# Patient Record
Sex: FEMALE | Race: WHITE | URBAN - METROPOLITAN AREA
[De-identification: names, ages, dates, MRNs, and addresses within clinical notes are randomized per-mention and may not be internally consistent; named-entity substitution may affect disease eponyms.]

---

## 2021-12-30 ENCOUNTER — WEB ENCOUNTER (OUTPATIENT)
Dept: URBAN - METROPOLITAN AREA CLINIC 105 | Facility: CLINIC | Age: 26
End: 2021-12-30

## 2021-12-30 ENCOUNTER — LAB OUTSIDE AN ENCOUNTER (OUTPATIENT)
Dept: URBAN - METROPOLITAN AREA CLINIC 105 | Facility: CLINIC | Age: 26
End: 2021-12-30

## 2021-12-30 ENCOUNTER — OFFICE VISIT (OUTPATIENT)
Dept: URBAN - METROPOLITAN AREA CLINIC 105 | Facility: CLINIC | Age: 26
End: 2021-12-30
Payer: COMMERCIAL

## 2021-12-30 VITALS
WEIGHT: 118.4 LBS | SYSTOLIC BLOOD PRESSURE: 117 MMHG | HEART RATE: 54 BPM | BODY MASS INDEX: 27.4 KG/M2 | DIASTOLIC BLOOD PRESSURE: 77 MMHG | HEIGHT: 55 IN | TEMPERATURE: 96.8 F

## 2021-12-30 DIAGNOSIS — K59.00 CONSTIPATION, UNSPECIFIED: ICD-10-CM

## 2021-12-30 DIAGNOSIS — L98.8 FISTULA: ICD-10-CM

## 2021-12-30 DIAGNOSIS — M54.50 LUMBAR PAIN: ICD-10-CM

## 2021-12-30 DIAGNOSIS — R11.2 NAUSEA WITH VOMITING, UNSPECIFIED: ICD-10-CM

## 2021-12-30 DIAGNOSIS — K90.89 BILE SALT-INDUCED DIARRHEA: ICD-10-CM

## 2021-12-30 DIAGNOSIS — Z90.49 HISTORY OF BOWEL RESECTION: ICD-10-CM

## 2021-12-30 DIAGNOSIS — R10.9 ABDOMINAL PAIN OF MULTIPLE SITES: ICD-10-CM

## 2021-12-30 PROBLEM — 10743008: Status: ACTIVE | Noted: 2021-12-30

## 2021-12-30 PROCEDURE — 99205 OFFICE O/P NEW HI 60 MIN: CPT | Performed by: INTERNAL MEDICINE

## 2021-12-30 RX ORDER — NORELGESTROMIN AND ETHINYL ESTRADIOL 150; 35 UG/D; UG/D
AS DIRECTED PATCH TRANSDERMAL
Status: ACTIVE | COMMUNITY

## 2021-12-30 RX ORDER — CHOLESTYRAMINE 4 G/9G
1 PACKET MIXED WITH WATER OR NON-CARBONATED DRINK POWDER, FOR SUSPENSION ORAL
Qty: 30 | Refills: 2 | OUTPATIENT
Start: 2021-12-30

## 2021-12-30 RX ORDER — ONDANSETRON 4 MG/1
1 TABLET ON THE TONGUE AND ALLOW TO DISSOLVE TABLET, ORALLY DISINTEGRATING ORAL
Qty: 15 | Refills: 2 | OUTPATIENT
Start: 2021-12-30

## 2021-12-30 NOTE — HPI-TODAY'S VISIT:
Today, the patient presents with a complicated prior medical/surgical history. She was born at 29 weeks - 10 days after the birth she became. 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 She was at [facility] for a total of 5 months. Other surgeries include a partial bowel resection with an appendectomy. At 10, she started having frequent abdominal pain - on laparoscopy adhesions were found.  In Feb 2019, she was admitted to [facility] due to nausea/vomiting - X-ray found she had a SBO. 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 She was also found to have a 4 inch gallstone and diverticulitis during this time. The patient still has her gallbladder, but the patient would like it taken out in order to avoid the possibility of having another gallstone. The patient's mother states that the patient's gallbladder was not taken out during that hospital visit due to her already being in surgery for so long. She has previously had CT, US, and MRI. She has not had had a colonoscpy - patient says the initial surgeon, Dr. Castaneda, was apprehensive doing a colonoscopy stating she had a higher risk of perforation.   Currently has constant nausea since the age of 10. She does not take an anti-nausea med at home - only drinks water and lays down with a hot pad when nauseated She says she received an IV anti-nausea med (either Zofran or Reglan) in the hospital, but it worsened her nausea and resulted in emesis. She has experienced weight loss at times due to nausea - at its worst, her weight was 106 lbs last year (normal is 120s) last year, but also notes having stress (bad roommate situation and school stress) and a loss of appetite. She last had emesis when she was last at school, which was 6 weeks ago. She has previously followed with a GI - Dr. Juan White - and discussed the issue of nausea with him. She says her PCP thinks she has POTS - patient is trying to establish with a cardiologist at [facility] - due to her nausea being coupled with an elevated heart rate and notes improvement when lying down. Nausea is the most limiting symptom to her she states.   She goes to [facility] and follows with a PCP at the [facility]. She says tests done with her PCP noted that her blood cell count was abnormal and "had inflammation." She was having a lot of watery stools at the time. She has diarrhea 2x/week with 1 formed BM in between. Her diarrhea is typically urgent following a high-fat meal. She also notes a burning at the anus when having a BM and the feeling of "little sharp pieces" coming out of her anus with a BM. Diarrhea is the most bothersome part of her bowel habit. There are also times where she is constipated - can go up to 3 days without a BM and strains. Stools can be little balls. The patient has been diagnosed with IBS and was prescribed a med by Dr. White. She notes a LLQ "tightening" that occurs 1x/week and lasts 2 seconds. She notes lumbar pain - says she was told in the hospital that it could be related to her gallbladder. She notes "food aversions" - avoids red meats and seeded foods (incorrectly advised previously to avoid due to prior history of diverticulitis). Her diet consists of mashed potatoes, soups, kale, chicken, yogurt, and cheese.

## 2021-12-31 ENCOUNTER — DASHBOARD ENCOUNTERS (OUTPATIENT)
Age: 26
End: 2021-12-31

## 2021-12-31 PROBLEM — 14760008 CONSTIPATION: Status: ACTIVE | Noted: 2021-12-30

## 2021-12-31 PROBLEM — 442461003: Status: ACTIVE | Noted: 2021-12-30

## 2021-12-31 PROBLEM — 69980003: Status: ACTIVE | Noted: 2021-12-30

## 2022-01-06 LAB
A/G RATIO: 1.8
ALBUMIN: 4.6
ALKALINE PHOSPHATASE: 73
ALT (SGPT): 14
AST (SGOT): 20
BASO (ABSOLUTE): 0
BASOS: 1
BILIRUBIN, TOTAL: 1.4
BUN/CREATININE RATIO: 10
BUN: 9
C-REACTIVE PROTEIN, QUANT: 5
CALCIUM: 10
CARBON DIOXIDE, TOTAL: 23
CHLORIDE: 102
CREATININE: 0.89
EGFR IF AFRICN AM: 102
EGFR IF NONAFRICN AM: 88
EOS (ABSOLUTE): 0.2
EOS: 2
GLOBULIN, TOTAL: 2.6
GLUCOSE: 79
HEMATOCRIT: 43.4
HEMATOLOGY COMMENTS:: (no result)
HEMOGLOBIN: 14.8
IMMATURE CELLS: (no result)
IMMATURE GRANS (ABS): 0
IMMATURE GRANULOCYTES: 0
IMMUNOGLOBULIN A, QN, SERUM: 90
LYMPHS (ABSOLUTE): 2.7
LYMPHS: 33
MCH: 31.4
MCHC: 34.1
MCV: 92
MONOCYTES(ABSOLUTE): 0.5
MONOCYTES: 6
NEUTROPHILS (ABSOLUTE): 4.8
NEUTROPHILS: 58
NRBC: (no result)
PLATELETS: 219
POTASSIUM: 4
PROTEIN, TOTAL: 7.2
RBC: 4.72
RDW: 11.9
SEDIMENTATION RATE-WESTERGREN: 21
SODIUM: 139
T-TRANSGLUTAMINASE (TTG) IGA: <2
T4,FREE(DIRECT): 1.34
TSH: 2.87
WBC: 8.3

## 2022-01-18 ENCOUNTER — OFFICE VISIT (OUTPATIENT)
Dept: URBAN - METROPOLITAN AREA MEDICAL CENTER 33 | Facility: MEDICAL CENTER | Age: 27
End: 2022-01-18
Payer: COMMERCIAL

## 2022-01-18 PROCEDURE — 996 AG2 (NON BILLABLE): Performed by: INTERNAL MEDICINE

## 2022-01-18 RX ORDER — CHOLESTYRAMINE 4 G/9G
1 PACKET MIXED WITH WATER OR NON-CARBONATED DRINK POWDER, FOR SUSPENSION ORAL
Qty: 30 | Refills: 2 | Status: ACTIVE | COMMUNITY
Start: 2021-12-30

## 2022-01-18 RX ORDER — ONDANSETRON 4 MG/1
1 TABLET ON THE TONGUE AND ALLOW TO DISSOLVE TABLET, ORALLY DISINTEGRATING ORAL
Qty: 15 | Refills: 2 | Status: ACTIVE | COMMUNITY
Start: 2021-12-30

## 2022-01-18 RX ORDER — NORELGESTROMIN AND ETHINYL ESTRADIOL 150; 35 UG/D; UG/D
AS DIRECTED PATCH TRANSDERMAL
Status: ACTIVE | COMMUNITY

## 2023-10-26 NOTE — PHYSICAL EXAM EYES:
Conjuntivae and eyelids appear normal,  Sclerae : White without injection Billing Type: Third-Party Bill